# Patient Record
Sex: FEMALE | Race: BLACK OR AFRICAN AMERICAN
[De-identification: names, ages, dates, MRNs, and addresses within clinical notes are randomized per-mention and may not be internally consistent; named-entity substitution may affect disease eponyms.]

---

## 2020-02-14 ENCOUNTER — HOSPITAL ENCOUNTER (EMERGENCY)
Dept: HOSPITAL 72 - EMR | Age: 60
Discharge: HOME | End: 2020-02-14
Payer: MEDICARE

## 2020-02-14 VITALS — DIASTOLIC BLOOD PRESSURE: 81 MMHG | SYSTOLIC BLOOD PRESSURE: 144 MMHG

## 2020-02-14 VITALS — HEIGHT: 62 IN | WEIGHT: 170 LBS | BODY MASS INDEX: 31.28 KG/M2

## 2020-02-14 DIAGNOSIS — E11.9: ICD-10-CM

## 2020-02-14 DIAGNOSIS — B37.9: Primary | ICD-10-CM

## 2020-02-14 DIAGNOSIS — E78.5: ICD-10-CM

## 2020-02-14 DIAGNOSIS — E78.00: ICD-10-CM

## 2020-02-14 DIAGNOSIS — Z88.6: ICD-10-CM

## 2020-02-14 DIAGNOSIS — I10: ICD-10-CM

## 2020-02-14 DIAGNOSIS — Z85.3: ICD-10-CM

## 2020-02-14 DIAGNOSIS — N39.0: ICD-10-CM

## 2020-02-14 LAB
ADD MANUAL DIFF: NO
ALBUMIN SERPL-MCNC: 4.2 G/DL (ref 3.4–5)
ALBUMIN/GLOB SERPL: 1.4 {RATIO} (ref 1–2.7)
ALP SERPL-CCNC: 125 U/L (ref 46–116)
ALT SERPL-CCNC: 43 U/L (ref 12–78)
ANION GAP SERPL CALC-SCNC: 14 MMOL/L (ref 5–15)
APPEARANCE UR: (no result)
APTT PPP: YELLOW S
AST SERPL-CCNC: 17 U/L (ref 15–37)
BASOPHILS NFR BLD AUTO: 2 % (ref 0–2)
BILIRUB SERPL-MCNC: 0.2 MG/DL (ref 0.2–1)
BUN SERPL-MCNC: 23 MG/DL (ref 7–18)
CALCIUM SERPL-MCNC: 9.3 MG/DL (ref 8.5–10.1)
CHLORIDE SERPL-SCNC: 111 MMOL/L (ref 98–107)
CK SERPL-CCNC: 137 U/L (ref 26–308)
CO2 SERPL-SCNC: 20 MMOL/L (ref 21–32)
CREAT SERPL-MCNC: 1.4 MG/DL (ref 0.55–1.3)
EOSINOPHIL NFR BLD AUTO: 0.8 % (ref 0–3)
ERYTHROCYTE [DISTWIDTH] IN BLOOD BY AUTOMATED COUNT: 12.7 % (ref 11.6–14.8)
GLOBULIN SER-MCNC: 3.1 G/DL
GLUCOSE UR STRIP-MCNC: (no result) MG/DL
HCT VFR BLD CALC: 39.9 % (ref 37–47)
HGB BLD-MCNC: 12.9 G/DL (ref 12–16)
KETONES UR QL STRIP: (no result)
LEUKOCYTE ESTERASE UR QL STRIP: (no result)
LYMPHOCYTES NFR BLD AUTO: 36.6 % (ref 20–45)
MCV RBC AUTO: 94 FL (ref 80–99)
MONOCYTES NFR BLD AUTO: 7.3 % (ref 1–10)
NEUTROPHILS NFR BLD AUTO: 53.4 % (ref 45–75)
NITRITE UR QL STRIP: NEGATIVE
PH UR STRIP: 5 [PH] (ref 4.5–8)
PLATELET # BLD: 204 K/UL (ref 150–450)
POTASSIUM SERPL-SCNC: 3.6 MMOL/L (ref 3.5–5.1)
PROT UR QL STRIP: (no result)
RBC # BLD AUTO: 4.25 M/UL (ref 4.2–5.4)
SODIUM SERPL-SCNC: 145 MMOL/L (ref 136–145)
SP GR UR STRIP: 1.02 (ref 1–1.03)
UROBILINOGEN UR-MCNC: NORMAL MG/DL (ref 0–1)
WBC # BLD AUTO: 5.4 K/UL (ref 4.8–10.8)

## 2020-02-14 PROCEDURE — 81003 URINALYSIS AUTO W/O SCOPE: CPT

## 2020-02-14 PROCEDURE — 87086 URINE CULTURE/COLONY COUNT: CPT

## 2020-02-14 PROCEDURE — 99284 EMERGENCY DEPT VISIT MOD MDM: CPT

## 2020-02-14 PROCEDURE — 84443 ASSAY THYROID STIM HORMONE: CPT

## 2020-02-14 PROCEDURE — 85379 FIBRIN DEGRADATION QUANT: CPT

## 2020-02-14 PROCEDURE — 82550 ASSAY OF CK (CPK): CPT

## 2020-02-14 PROCEDURE — 87181 SC STD AGAR DILUTION PER AGT: CPT

## 2020-02-14 PROCEDURE — 36415 COLL VENOUS BLD VENIPUNCTURE: CPT

## 2020-02-14 PROCEDURE — 85025 COMPLETE CBC W/AUTO DIFF WBC: CPT

## 2020-02-14 PROCEDURE — 84484 ASSAY OF TROPONIN QUANT: CPT

## 2020-02-14 PROCEDURE — 80053 COMPREHEN METABOLIC PANEL: CPT

## 2020-02-14 PROCEDURE — 96365 THER/PROPH/DIAG IV INF INIT: CPT

## 2020-02-14 PROCEDURE — 70450 CT HEAD/BRAIN W/O DYE: CPT

## 2020-02-14 PROCEDURE — 82607 VITAMIN B-12: CPT

## 2020-02-14 NOTE — DIAGNOSTIC IMAGING REPORT
EXAM:

  CT Head Without Intravenous Contrast

 

CLINICAL HISTORY:

  WEAK

 

TECHNIQUE:

  Axial computed tomography images of the head/brain without intravenous 

contrast.  CTDI is 53 mGy and DLP is 1046 mGy-cm.  One or more of the 

following dose reduction techniques were used: automated exposure control,

 adjustment of the mA and/or kV according to patient size, use of 

iterative reconstruction technique.

 

COMPARISON:

  01/09/20

 

FINDINGS:

  Brain:  Unremarkable.  No hemorrhage.  No significant white matter 

disease.  No edema.

  Ventricles:  Unremarkable.  No ventriculomegaly.

  Bones/joints:  Unremarkable.  No acute fracture.

  Soft tissues:  Unremarkable.

  Sinuses:  Unremarkable as visualized.  No acute sinusitis.

  Mastoid air cells:  Unremarkable as visualized.  No mastoid effusion.

  Other findings:  Motion degraded study.

 

IMPRESSION:     

  No acute intracranial abnormality identified on a Motion degraded study.

## 2020-02-14 NOTE — NUR
ED Nurse Note:



Patient walked in to ER c/o numbness on the face and bilateral hands and feet 
since morning and feeling weak. Stated went to [facility], 
they sent her here to do upper and lower extremeties doppler. Patient presented 
anxious, saying something wrong with me, AAO x4, VSS at this time.

## 2020-02-14 NOTE — EMERGENCY ROOM REPORT
History of Present Illness


General


Chief Complaint:  Pain


Source:  Patient





Present Illness


HPI


Disclaimer: Please note that this report is being documented using DRAGON 

technology. This can lead to erroneous entry secondary to incorrect 

interpretation by the dictating instrument.





HPI: 59-year-old female history of hypertension, hyperlipidemia and diabetes 

presents for evaluation of myalgias and numbness.  Symptoms have been present 

for approximately 1 month.  She notes some numbness and tingling as well as 

pain in the fingers as well as the toes.  Denies any changes in strength or 

coordination.  She is able to bear weight and ambulate.  No reported trauma.  

She also complains of some tingling and numbness around her mouth on both 

sides.  Denies facial droop, changes in her vision, headaches.  Denies any 

swelling, skin changes such as erythema or warmth, breakdown or rash.  Denies 

any recent fevers or chills.  She does note some fatigue recently.  Denies any 

chest pain, palpitations, nausea, vomiting, diarrhea.  Denies dysuria or 

hematuria.  She was referred to outpatient imaging for vascular studies to 

evaluate for claudication.  She came to the emergency department instead.


 


PMH: Hypertension, hyperlipidemia, diabetes


 


PSH: Reviewed


 


Allergies: Morphine


 


Social Hx: Denies


Allergies:  


Coded Allergies:  


     MORPHINE (Verified  Allergy, Unknown, 2/22/16)





Nursing Documentation-PMH


Hx Cardiac Problems:  No - HIGH CHOLESTROL


Hx Hypertension:  Yes


Hx Pacemaker:  No


Hx Asthma:  No


Hx COPD:  No


Hx Diabetes:  Yes


Hx Cancer:  Yes - Left breast ("Cancer free")


Hx Gastrointestinal Problems:  No


Hx Dialysis:  No


History Of Psychiatric Problem:  No


Hx Neurological Problems:  No


Hx Cerebrovascular Accident:  No


Hx Seizures:  No





Review of Systems


All Other Systems:  negative except mentioned in HPI





Physical Exam





Vital Signs








  Date Time  Temp Pulse Resp B/P (MAP) Pulse Ox O2 Delivery O2 Flow Rate FiO2


 


2/14/20 18:56 97.5 80 14 144/81 (102) 97 Room Air  





 


 





General: Awake and alert, no acute distress


HEENT: NC/AT. EOMI. PERRLA.  Visual fields are full.  No nystagmus.  Facial 

expressions are symmetrical.  No facial droop.


Cardiovascular: RRR.  S1 and S2 normal.  No murmur appreciated


Resp: Normal work of breathing. No cough, wheezing or crackles appreciated


Abdomen: Abdomen is soft, nondistended.  Nontender


Skin: Intact.  No abrasions, laceration or rash over the exposed skin


MSK: Normal tone and bulk. Moving all extremities.  No obvious deformity.  

There is no drift in the upper or lower extremities bilaterally.  There is some 

tenderness to palpation over the feet and over the hands without obvious 

deformity.  Full range of motion.  2+ radial pulses.  2+ PT and DP pulses.  

Capillary refill is brisk.


Neuro: Awake and alert.  Mentating appropriately.  Facial expression 

symmetrical.  No dysarthria, no ataxia on finger-nose-finger or heel-to-shin 

testing.  Sensation to light touch is intact over the upper and lower 

extremities.  The patient has intact speech with good repetition, 

comprehension.  Fund of knowledge is full.  No aphasia, no neglect.


NIH: 0





Medical Decision Making


Diagnostic Impression:  


 Primary Impression:  


 Yeast infection


 Additional Impression:  


 UTI (urinary tract infection)


ER Course


59-year-old female presents for evaluation of 1 month numbness/tingling in the 

hands and feet, aching hands and feet, perioral numbness and tingling without 

facial droop.  Differential includes was not limited to diabetic neuropathy, 

claudication, DVT, electrolyte abnormality, vitamin deficiency, remote CVA.  

Currently she is asymptomatic aside from the tenderness in the hands and feet.  

She has brisk pulses and capillary refill in both the upper and lower 

extremities.  Lower extremities are soft and nontender, no asymmetry 

appreciated.  Clinically she has no signs of DVT though will obtain a d-dimer 

to further evaluate.  Will also start a cardiology and infectious work-up and 

get a CT scan of the head though clinically have low suspicion for intracranial 

process.  She is outside the window for any acute intervention of possible 

stroke.





Laboratory Tests








Test


  2/14/20


19:55


 


White Blood Count


  5.4 K/UL


(4.8-10.8)


 


Red Blood Count


  4.25 M/UL


(4.20-5.40)


 


Hemoglobin


  12.9 G/DL


(12.0-16.0)


 


Hematocrit


  39.9 %


(37.0-47.0)


 


Mean Corpuscular Volume 94 FL (80-99)  


 


Mean Corpuscular Hemoglobin


  30.3 PG


(27.0-31.0)


 


Mean Corpuscular Hemoglobin


Concent 32.3 G/DL


(32.0-36.0)


 


Red Cell Distribution Width


  12.7 %


(11.6-14.8)


 


Platelet Count


  204 K/UL


(150-450)


 


Mean Platelet Volume


  8.1 FL


(6.5-10.1)


 


Neutrophils (%) (Auto)


  53.4 %


(45.0-75.0)


 


Lymphocytes (%) (Auto)


  36.6 %


(20.0-45.0)


 


Monocytes (%) (Auto)


  7.3 %


(1.0-10.0)


 


Eosinophils (%) (Auto)


  0.8 %


(0.0-3.0)


 


Basophils (%) (Auto)


  2.0 %


(0.0-2.0)


 


D-Dimer


  0.23 mg/L FEU


(0.00-0.49)


 


Urine Color Yellow  


 


Urine Appearance


  Slightly


cloudy


 


Urine pH 5 (4.5-8.0)  


 


Urine Specific Gravity


  1.020


(1.005-1.035)


 


Urine Protein


  2+ (NEGATIVE)


H


 


Urine Glucose (UA)


  3+ (NEGATIVE)


H


 


Urine Ketones


  1+ (NEGATIVE)


H


 


Urine Blood


  Negative


(NEGATIVE)


 


Urine Nitrite


  Negative


(NEGATIVE)


 


Urine Bilirubin


  Negative


(NEGATIVE)


 


Urine Urobilinogen


  Normal MG/DL


(0.0-1.0)


 


Urine Leukocyte Esterase


  1+ (NEGATIVE)


H


 


Urine RBC


  0-2 /HPF (0 -


2)


 


Urine WBC


  2-4 /HPF (0 -


2)


 


Urine Squamous Epithelial


Cells Few /LPF


(NONE/OCC)


 


Urine Bacteria


  Many /HPF


(NONE)  H


 


Urine Yeast


  Many /HPF


(NONE)  H


 


Sodium Level


  145 MMOL/L


(136-145)


 


Potassium Level


  3.6 MMOL/L


(3.5-5.1)


 


Chloride Level


  111 MMOL/L


()  H


 


Carbon Dioxide Level


  20 MMOL/L


(21-32)  L


 


Anion Gap


  14 mmol/L


(5-15)


 


Blood Urea Nitrogen


  23 mg/dL


(7-18)  H


 


Creatinine


  1.4 MG/DL


(0.55-1.30)  H


 


Estimate Glomerular


Filtration Rate 46.7 mL/min


(>60)


 


Glucose Level


  87 MG/DL


()


 


Calcium Level


  9.3 MG/DL


(8.5-10.1)


 


Total Bilirubin


  0.2 MG/DL


(0.2-1.0)


 


Aspartate Amino Transferase


(AST) 17 U/L (15-37)


 


 


Alanine Aminotransferase (ALT)


  43 U/L (12-78)


 


 


Alkaline Phosphatase


  125 U/L


()  H


 


Total Creatine Kinase


  137 U/L


()


 


Troponin I


  0.000 ng/mL


(0.000-0.056)


 


Total Protein


  7.3 G/DL


(6.4-8.2)


 


Albumin


  4.2 G/DL


(3.4-5.0)


 


Globulin 3.1 g/dL  


 


Albumin/Globulin Ratio 1.4 (1.0-2.7)  


 


Vitamin B12 Level


  > 2000 PG/ML


(193-986)  H


 


Thyroid Stimulating Hormone


(TSH) 0.915 uiU/mL


(0.358-3.740)








CT/MRI/US Diagnostic Results


CT/MRI/US Diagnostic Results :  


   Impression


Final Report


EXAM:


CT Head Without Intravenous Contrast





CLINICAL HISTORY:


WEAK





TECHNIQUE:


Axial computed tomography images of the head/brain without intravenous 

contrast. CTDI is 53 mGy and DLP is 1046 mGy-cm. One or more of the following 

dose reduction techniques were used: automated exposure control, adjustment of 

the mA and/or kV according to patient size, use of iterative reconstruction 

technique.





COMPARISON:


01/09/20





FINDINGS:


Brain: Unremarkable. No hemorrhage. No significant white matter disease. No 

edema.


Ventricles: Unremarkable. No ventriculomegaly.


Bones/joints: Unremarkable. No acute fracture.


Soft tissues: Unremarkable.


Sinuses: Unremarkable as visualized. No acute sinusitis.


Mastoid air cells: Unremarkable as visualized. No mastoid effusion.


Other findings: Motion degraded study.





IMPRESSION:


No acute intracranial abnormality identified on a Motion degraded study.





Radiologist: Partha Hernández MD   Electronically Signed: 02/14/20 21:43   Phone

: [phone]


Study ready at 21:35 and initial results transmitted at 21:43


Reevaluation Time:  21:57





Last Vital Signs








  Date Time  Temp Pulse Resp B/P (MAP) Pulse Ox O2 Delivery O2 Flow Rate FiO2


 


2/14/20 21:31 97.5       


 


2/14/20 19:14   14 144/81 97 Room Air  


 


2/14/20 18:56  80      








Reevaluation Impression


D-dimer and troponin unremarkable.  Labs show no significant electrolyte 

abnormalities, vitamin B12 is high.  Urinalysis concerning for urinary tract 

infection and yeast infection.  States she has had yeast infections and UTIs in 

the past.  Denies any recent discharge or dysuria.  Will treat with Diflucan 

and ceftriaxone in the ED and discharged with Keflex and an additional dose of 

Diflucan to be taken in 3 days.  CT scan of the head does not show evidence of 

acute infarct.  Believe her symptoms of hand and foot pain are consistent with 

diabetic neuropathy though I did encourage her to complete the vascular studies 

ordered by her PMD on an outpatient basis.  Do not believe he requires further 

treatment or hospitalization at this time.  She is stable for outpatient follow-

up.  We discussed reasons to return to the emergency department.  She 

understands and agrees with this treatment plan.


Disposition:  HOME, SELF-CARE


Condition:  Stable


Scripts


Fluconazole (FLUCONAZOLE) 150 Mg Tablet


150 MG ORAL ONCE, #1 TAB


   Prov: Carl Romo MD         2/14/20 


Cephalexin* (KEFLEX*) 500 Mg Capsule


500 MG ORAL EVERY 12 HOURS, #14 CAP 0 Refills


   Prov: Carl Romo MD         2/14/20


Referrals:  


NON PHYSICIAN (PCP)











Carl Romo MD Feb 14, 2020 21:59

## 2020-03-18 ENCOUNTER — HOSPITAL ENCOUNTER (EMERGENCY)
Dept: HOSPITAL 72 - EMR | Age: 60
Discharge: HOME | End: 2020-03-18
Payer: MEDICARE

## 2020-03-18 VITALS — DIASTOLIC BLOOD PRESSURE: 76 MMHG | SYSTOLIC BLOOD PRESSURE: 126 MMHG

## 2020-03-18 VITALS — WEIGHT: 160 LBS | BODY MASS INDEX: 29.44 KG/M2 | HEIGHT: 62 IN

## 2020-03-18 VITALS — SYSTOLIC BLOOD PRESSURE: 123 MMHG | DIASTOLIC BLOOD PRESSURE: 75 MMHG

## 2020-03-18 DIAGNOSIS — B37.9: ICD-10-CM

## 2020-03-18 DIAGNOSIS — E78.00: ICD-10-CM

## 2020-03-18 DIAGNOSIS — Z85.3: ICD-10-CM

## 2020-03-18 DIAGNOSIS — N39.0: Primary | ICD-10-CM

## 2020-03-18 DIAGNOSIS — Z88.6: ICD-10-CM

## 2020-03-18 DIAGNOSIS — I10: ICD-10-CM

## 2020-03-18 LAB
APPEARANCE UR: CLEAR
APTT PPP: (no result) S
GLUCOSE UR STRIP-MCNC: (no result) MG/DL
KETONES UR QL STRIP: NEGATIVE
LEUKOCYTE ESTERASE UR QL STRIP: (no result)
NITRITE UR QL STRIP: NEGATIVE
PH UR STRIP: 5 [PH] (ref 4.5–8)
PROT UR QL STRIP: (no result)
SP GR UR STRIP: 1.02 (ref 1–1.03)
UROBILINOGEN UR-MCNC: NORMAL MG/DL (ref 0–1)

## 2020-03-18 PROCEDURE — 81003 URINALYSIS AUTO W/O SCOPE: CPT

## 2020-03-18 PROCEDURE — 99283 EMERGENCY DEPT VISIT LOW MDM: CPT

## 2020-03-18 PROCEDURE — 87086 URINE CULTURE/COLONY COUNT: CPT

## 2020-03-18 NOTE — NUR
ER DISCHARGE NOTE:

Patient is cleared to be discharged per ERMD, pt is aox4, on room air, with 
stable vital signs. pt was given dc and prescription instructions, pt was able 
to verbalize understanding, pt id band removed. pt is able to ambulate with 
steady gait. pt took all belongings. Pt given 3 prescirptions.

## 2020-03-18 NOTE — NUR
ED Nurse Note:

Pt walked into ED for bladder pain 10/10 for 1 week. Pt states pain radiates to 
back. Pt also feels dizzy. Pt has hx of cancer and DM. Pt denies other vaginal 
discharge, numbness, N&V. Pt is alert and orientedx4, ambulatory.

## 2020-03-18 NOTE — EMERGENCY ROOM REPORT
History of Present Illness


General


Chief Complaint:  Female Urogenital Problems





Present Illness


HPI


59-year-old female with history of recurrent UTI here complaining of 3 days of 

dysuria and urinary frequency.  Denies any fever and chills, flank pain, nausea 

vomiting.  Reports that has not yet seen a specialist for it.  Denies any 

vaginal discharge at this time.  Denies being sexually active.  Sitting 

comfortably with stable vital signs.


COVID-19 risk:Travel to affect:  No


Has patient experienced corona:  No


Allergies:  


Coded Allergies:  


     MORPHINE (Verified  Allergy, Unknown, 2/22/16)





Patient History


Past Medical History:  see triage record


Past Surgical History:  none


Pertinent Family History:  none


Pregnant Now:  No


Immunizations:  UTD


Reviewed Nursing Documentation:  PMH: Agreed; PSxH: Agreed





Nursing Documentation-PMH


Hx Cardiac Problems:  No - HIGH CHOLESTROL


Hx Hypertension:  Yes


Hx Pacemaker:  No


Hx Asthma:  No


Hx COPD:  No


Hx Diabetes:  Yes


Hx Cancer:  Yes - Left breast ("Cancer free")


Hx Gastrointestinal Problems:  No


Hx Dialysis:  No


Hx Neurological Problems:  No


Hx Cerebrovascular Accident:  No


Hx Seizures:  No





Review of Systems


All Other Systems:  negative except mentioned in HPI





Physical Exam





Vital Signs








  Date Time  Temp Pulse Resp B/P (MAP) Pulse Ox O2 Delivery O2 Flow Rate FiO2


 


3/18/20 12:56 98.4 106 18 122/70 (87) 98 Room Air  








Sp02 EP Interpretation:  reviewed, normal


General Appearance:  no apparent distress, alert, GCS 15, non-toxic


Head:  normocephalic, atraumatic


Eyes:  bilateral eye normal inspection, bilateral eye PERRL


ENT:  hearing grossly normal, normal pharynx, no angioedema, normal voice


Neck:  full range of motion, supple/symm/no masses


Respiratory:  chest non-tender, lungs clear, normal breath sounds, no rhonchi, 

speaking full sentences


Cardiovascular #1:  regular rate, rhythm, no edema


Gastrointestinal:  normal bowel sounds, non tender, soft, non-distended, no 

guarding, no rebound


Rectal:  deferred


Genitourinary:  no CVA tenderness


Musculoskeletal:  back normal


Neurologic:  alert, motor strength/tone normal, oriented x3, sensory intact, 

responsive, speech normal


Psychiatric:  judgement/insight normal, memory normal, mood/affect normal, no 

suicidal/homicidal ideation


Skin:  no rash


Lymphatic:  no adenopathy





Medical Decision Making


PA Attestation


All my diagnosis and treatment plans were reviewed ad discussed with my 

supervising physician Dr. Richardson


Diagnostic Impression:  


 Primary Impression:  


 UTI (urinary tract infection)


 Additional Impression:  


 Yeast infection


ER Course


59-year-old female with history of recurrent UTI here complaining of 3 days of 

dysuria and urinary frequency.  Denies any fever and chills, flank pain, nausea 

vomiting.  Reports that has not yet seen a specialist for it.  Denies any 

vaginal discharge at this time.  Denies being sexually active.  Sitting 

comfortably with stable vital signs.





Ddx considered but are not limited to: UTI, pyelonephritis, urinary incontinence

, prolapsed bladder














Vital signs: are WNL, pt. is afebrile








 H&PE are most consistent with: UTI and incidental finding of yeast infection














ORDERS: UA, urine cx, Diflucan, Keflex, Pyridium














ED INTERVENTIONS: None required at this time.























DISCHARGE: At this time pt. is stable for d/c to home. Will provide printed 

patient care instructions, and any necessary prescriptions. Care plan and 

follow up instructions have been discussed with the patient prior to discharge.

  See primary doctor for referral to urologist, take medication as directed, 

increase oral hydration, if worsening symptoms return to the emergency room





Last Vital Signs








  Date Time  Temp Pulse Resp B/P (MAP) Pulse Ox O2 Delivery O2 Flow Rate FiO2


 


3/18/20 13:19 98.4 65 20 126/76 97 Room Air  








Disposition:  HOME, SELF-CARE


Condition:  Stable


Scripts


Fluconazole (FLUCONAZOLE) 100 Mg Tablet


150 MG ORAL DAILY for 1 Day, #2 TAB 0 Refills


   Prov: Jaquan Nair         3/18/20 


Phenazopyridine Hcl* (PYRIDIUM*) 200 Mg Tablet


200 MG ORAL THREE TIMES A DAY for 2 Days, #6 TAB 0 Refills


   Prov: Jaquan Nair         3/18/20 


Cephalexin* (KEFLEX*) 500 Mg Capsule


500 MG ORAL EVERY 6 HOURS for 7 Days, #28 CAP


   Prov: Jaquan Nair         3/18/20


Patient Instructions:  Urinary Tract Infection





Additional Instructions:  


Take medication as directed, increase oral hydration, follow-up with your 

specialist, if worsening symptoms return to the emergency room











Jaquan Nair Mar 18, 2020 14:04